# Patient Record
Sex: FEMALE | Race: WHITE | Employment: PART TIME | URBAN - METROPOLITAN AREA
[De-identification: names, ages, dates, MRNs, and addresses within clinical notes are randomized per-mention and may not be internally consistent; named-entity substitution may affect disease eponyms.]

---

## 2021-09-07 ENCOUNTER — HOSPITAL ENCOUNTER (EMERGENCY)
Age: 51
Discharge: HOME | End: 2021-09-07
Attending: EMERGENCY MEDICINE
Payer: MEDICAID

## 2021-09-07 VITALS
RESPIRATION RATE: 14 BRPM | TEMPERATURE: 97.9 F | DIASTOLIC BLOOD PRESSURE: 81 MMHG | HEART RATE: 81 BPM | OXYGEN SATURATION: 97 % | SYSTOLIC BLOOD PRESSURE: 137 MMHG

## 2021-09-07 DIAGNOSIS — N30.00 ACUTE CYSTITIS WITHOUT HEMATURIA: Primary | ICD-10-CM

## 2021-09-07 LAB
AMORPHOUS: ABNORMAL /HPF
BACTERIA: ABNORMAL /HPF
BILIRUBIN URINE: NEGATIVE
BLOOD, URINE: ABNORMAL
CLARITY: ABNORMAL
COLOR: ABNORMAL
EPITHELIAL CELLS, UA: ABNORMAL /HPF (ref 0–5)
GLUCOSE URINE: NEGATIVE MG/DL
HCG(URINE) PREGNANCY TEST: NEGATIVE
KETONES, URINE: ABNORMAL MG/DL
LEUKOCYTE ESTERASE, URINE: ABNORMAL
MICROSCOPIC EXAMINATION: YES
NITRITE, URINE: POSITIVE
PH UA: 6.5 (ref 5–8)
PROTEIN UA: NEGATIVE MG/DL
RBC UA: ABNORMAL /HPF (ref 0–4)
SPECIFIC GRAVITY UA: 1.02 (ref 1–1.03)
URINE REFLEX TO CULTURE: YES
URINE TYPE: ABNORMAL
UROBILINOGEN, URINE: 4 E.U./DL
WBC UA: ABNORMAL /HPF (ref 0–5)

## 2021-09-07 PROCEDURE — 81001 URINALYSIS AUTO W/SCOPE: CPT

## 2021-09-07 PROCEDURE — 99283 EMERGENCY DEPT VISIT LOW MDM: CPT

## 2021-09-07 PROCEDURE — 87086 URINE CULTURE/COLONY COUNT: CPT

## 2021-09-07 PROCEDURE — 84703 CHORIONIC GONADOTROPIN ASSAY: CPT

## 2021-09-07 RX ORDER — SULFAMETHOXAZOLE AND TRIMETHOPRIM 800; 160 MG/1; MG/1
1 TABLET ORAL 2 TIMES DAILY
Qty: 14 TABLET | Refills: 0 | Status: SHIPPED | OUTPATIENT
Start: 2021-09-07 | End: 2021-09-14

## 2021-09-07 RX ORDER — PHENAZOPYRIDINE HYDROCHLORIDE 100 MG/1
100 TABLET, FILM COATED ORAL 3 TIMES DAILY PRN
Qty: 6 TABLET | Refills: 0 | Status: SHIPPED | OUTPATIENT
Start: 2021-09-07 | End: 2021-09-10

## 2021-09-07 NOTE — ED PROVIDER NOTES
CHIEF COMPLAINT  Dysuria (painful urination x 1 week, denies n/v/d denies fever,, denies d/c )      HISTORY OF PRESENT ILLNESS  Vincent Mcgee is a 49 y.o. female who presents to the ED complaining of a 7-day history of urgency and dysuria. No fever. No chills. No hematuria. No nausea vomiting. No vaginal discharge. No back pain. No headache, sore throat, chest pain, shortness of breath, belly pain, rash. No other complaints, modifying factors or associated symptoms. Nursing notes reviewed. Past Medical History:   Diagnosis Date    Ovarian cyst     UTI (lower urinary tract infection)      History reviewed. No pertinent surgical history. History reviewed. No pertinent family history. Social History     Socioeconomic History    Marital status:      Spouse name: Not on file    Number of children: Not on file    Years of education: Not on file    Highest education level: Not on file   Occupational History    Not on file   Tobacco Use    Smoking status: Current Every Day Smoker     Packs/day: 0.50     Types: Cigarettes    Smokeless tobacco: Never Used   Substance and Sexual Activity    Alcohol use: No    Drug use: No    Sexual activity: Not on file   Other Topics Concern    Not on file   Social History Narrative    Not on file     Social Determinants of Health     Financial Resource Strain:     Difficulty of Paying Living Expenses:    Food Insecurity:     Worried About Running Out of Food in the Last Year:     [address] in the Last Year:    Transportation Needs:     Lack of Transportation (Medical):      Lack of Transportation (Non-Medical):    Physical Activity:     Days of Exercise per Week:     Minutes of Exercise per Session:    Stress:     Feeling of Stress :    Social Connections:     Frequency of Communication with Friends and Family:     Frequency of Social Gatherings with Friends and Family:     Attends Mandaeism Services:     Active Member of Clubs or Organizations:     Attends Club or Organization Meetings:     Marital Status:    Intimate Partner Violence:     Fear of Current or Ex-Partner:     Emotionally Abused:     Physically Abused:     Sexually Abused:      No current facility-administered medications for this encounter. Current Outpatient Medications   Medication Sig Dispense Refill    sulfamethoxazole-trimethoprim (BACTRIM DS) 800-160 MG per tablet Take 1 tablet by mouth 2 times daily for 7 days 14 tablet 0    phenazopyridine (PYRIDIUM) 100 MG tablet Take 1 tablet by mouth 3 times daily as needed for Pain 6 tablet 0    predniSONE (DELTASONE) 20 MG tablet 3 tabs po for 3 days  2 tabs po for 3 days  1 tab po for 3 days 18 tablet 0    ondansetron (ZOFRAN) 4 MG tablet Take 1 tablet by mouth every 8 hours as needed for Nausea 10 tablet 0    albuterol sulfate  (90 Base) MCG/ACT inhaler Inhale 2 puffs into the lungs every 6 hours as needed for Wheezing 1 Inhaler 0     Allergies   Allergen Reactions    Meclizine     Promethazine Hives    Ultram [Tramadol] Rash       REVIEW OF SYSTEMS  6 systems reviewed, pertinent positives per HPI otherwise noted to be negative    PHYSICAL EXAM  /81   Pulse 81   Temp 97.9 °F (36.6 °C) (Oral)   Resp 14   SpO2 97%   GENERAL APPEARANCE: Awake and alert. Cooperative. No acute distress. HEAD: Normocephalic. Atraumatic. EYES: no icterus   CHEST: Equal symmetric chest rise. LUNGS: Breathing is unlabored. Speaking comfortably in full sentences. ABD: soft, NT, ND  SKIN: Warm and dry. NEUROLOGICAL: Alert and oriented. Normal speech. Normal thought. Normal gait. RADIOLOGY  X-RAYS:  I have reviewed radiologic plain film image(s). ALL OTHER NON-PLAIN FILM IMAGES SUCH AS CT, ULTRASOUND AND MRI HAVE BEEN READ BY THE RADIOLOGIST. No orders to display              PROCEDURES    ED COURSE/MDM  Patient seen and evaluated. Patient has no CVA tenderness. No fever. No chills.   No nausea or vomiting. Her urinalysis is positive for urinary tract infection. She will be treated appropriately. Return precautions were given. Reasons to RT ED discussed. Patient expresses understanding and is in agreement with plan. Patient was given scripts for the following medications. I counseled patient how to take these medications. Discharge Medication List as of 9/7/2021 12:34 PM      START taking these medications    Details   sulfamethoxazole-trimethoprim (BACTRIM DS) 800-160 MG per tablet Take 1 tablet by mouth 2 times daily for 7 days, Disp-14 tablet, R-0Print      phenazopyridine (PYRIDIUM) 100 MG tablet Take 1 tablet by mouth 3 times daily as needed for Pain, Disp-6 tablet, R-0Print                 CLINICAL IMPRESSION  1. Acute cystitis without hematuria        Blood pressure 137/81, pulse 81, temperature 97.9 °F (36.6 °C), temperature source Oral, resp. rate 14, SpO2 97 %, not currently breastfeeding. DISPOSITION  Patient was discharged to home in good condition.        Cici Garcia MD  09/07/21 7741

## 2021-09-07 NOTE — LETTER
[address]   [address] [facility] [address]  Phone: [phone]             September 7, 2021    Patient: Brice Basurto   YOB: 1970   Date of Visit: 9/7/2021       To Whom It May Concern:    Tavia Sanchez was seen and treated in our emergency department on 9/7/2021. She may return to work on 9/8/2021.       Sincerely,             Signature:__________________________________

## 2021-09-07 NOTE — ED NOTES
Pt d/c home with AVS no s.s of distress noted script x 2 in hand pt mariano.romina home      Alma Andujar RN  09/07/21 8904

## 2021-09-08 LAB — URINE CULTURE, ROUTINE: NORMAL

## 2021-12-30 ENCOUNTER — HOSPITAL ENCOUNTER (EMERGENCY)
Age: 51
Discharge: HOME | End: 2021-12-30
Attending: EMERGENCY MEDICINE
Payer: MEDICAID

## 2021-12-30 ENCOUNTER — APPOINTMENT (OUTPATIENT)
Dept: GENERAL RADIOLOGY | Age: 51
End: 2021-12-30
Payer: MEDICAID

## 2021-12-30 VITALS
WEIGHT: 121 LBS | OXYGEN SATURATION: 99 % | BODY MASS INDEX: 19.53 KG/M2 | DIASTOLIC BLOOD PRESSURE: 78 MMHG | TEMPERATURE: 98.6 F | HEART RATE: 85 BPM | SYSTOLIC BLOOD PRESSURE: 114 MMHG | RESPIRATION RATE: 16 BRPM

## 2021-12-30 DIAGNOSIS — R05.9 COUGH: Primary | ICD-10-CM

## 2021-12-30 LAB — SARS-COV-2: DETECTED

## 2021-12-30 PROCEDURE — U0005 INFEC AGEN DETEC AMPLI PROBE: HCPCS

## 2021-12-30 PROCEDURE — 99283 EMERGENCY DEPT VISIT LOW MDM: CPT

## 2021-12-30 PROCEDURE — 71045 X-RAY EXAM CHEST 1 VIEW: CPT

## 2021-12-30 PROCEDURE — U0003 INFECTIOUS AGENT DETECTION BY NUCLEIC ACID (DNA OR RNA); SEVERE ACUTE RESPIRATORY SYNDROME CORONAVIRUS 2 (SARS-COV-2) (CORONAVIRUS DISEASE [COVID-19]), AMPLIFIED PROBE TECHNIQUE, MAKING USE OF HIGH THROUGHPUT TECHNOLOGIES AS DESCRIBED BY CMS-2020-01-R: HCPCS

## 2021-12-30 RX ORDER — DOXYCYCLINE HYCLATE 100 MG
100 TABLET ORAL 2 TIMES DAILY
Qty: 20 TABLET | Refills: 0 | Status: SHIPPED | OUTPATIENT
Start: 2021-12-30 | End: 2022-01-09

## 2021-12-30 NOTE — LETTER
January 2, 2022    [address] [facility]      Dear Ms. Kevin Hernandez,    During your Emergency Department visit on 12/30/2021, radiology and/or lab tests were taken and sent for analysis. The Emergency Department physician has reviewed the results and would like to discuss the findings with you. As of this time, attempts to reach you have been unsuccessful. Please contact the Emergency Department at [phone] and ask to speak to the Charge Nurse regarding your results and the possible need for further treatment. The patient was called for notification of a POSITIVE test result for COVID-19. The number is incorrect:     The COVID-19 test result was positive   Treatment of coronavirus does not require an antibiotic   Remain isolated for 10 days since symptoms first appeared AND At least 3 days have passed after recovery (Recovery is defined as resolution of fever without the use of fever-reducing medications with progressive improvement or resolution of other symptoms).  Wash hands often with soap and water for at least 20 seconds or alternatively use hand  with at least 60% alcohol content   Cover coughs and sneezes   Wear a mask when around others if possible   Clean all high-touch surfaces every day, such as doorknobs and cellphones   Continually monitor symptoms. Contact a medical provider if symptoms are worsening, such as difficulty breathing.    For additional information, please visit the Centers for Disease Control and Prevention ([email or web address].cy.       Sincerely,     Dr. Doc Green [phone]

## 2021-12-30 NOTE — LETTER
[place] [address]  Phone: [phone]               December 30, 2021    Patient: Tony Gonzalez   YOB: 1970   Date of Visit: 12/30/2021       To Whom It May Concern:    Romero Cancer was seen and treated in our emergency department on 12/30/2021. She may return to work on 1/3/2022.       Sincerely,       Saturnino Lynch RN        Signature:__________________________________

## 2021-12-30 NOTE — ED PROVIDER NOTES
[address] ENCOUNTER      Pt Name: Dannie Cortez  MRN: [id number]  [place] 1970  Date of evaluation: 12/30/2021  Provider: Amy Tariq MD    [address]       Chief Complaint   Patient presents with    Fatigue     c/o weakness and decreased appetite since 12/25. No nausea, vomiting or diarrhea. Family member recently tested positive for Covid       HISTORY OF PRESENT ILLNESS    Dannie Cortez is a 49 y.o. female who presents to the emergency department with cough and fatigue. Endorses cough and fatigue last few days. Positive for no energy. Concern for Covid. Endorses smoking and possible wheezing. No shortness of breath or chest pain. Symptoms started spontaneously. Been constant in nature. Positive for 2 out of 10 achy body aches. No other associated symptoms. Nursing Notes were reviewed. Including nursing noted for FM, Surgical History, Past Medical History, Social History, vitals, and allergies; agree with all. REVIEW OF SYSTEMS       Review of Systems    Except as noted above the remainder of the review of systems was reviewed and negative. PAST MEDICAL HISTORY     Past Medical History:   Diagnosis Date    Ovarian cyst     UTI (lower urinary tract infection)        SURGICAL HISTORY     History reviewed. No pertinent surgical history. CURRENT MEDICATIONS       Discharge Medication List as of 12/30/2021 11:24 AM      CONTINUE these medications which have NOT CHANGED    Details   albuterol sulfate  (90 Base) MCG/ACT inhaler Inhale 2 puffs into the lungs every 6 hours as needed for Wheezing, Disp-1 Inhaler, R-0Print             ALLERGIES     Meclizine, Promethazine, and Ultram [tramadol]    FAMILY HISTORY      History reviewed. No pertinent family history.     SOCIAL HISTORY       Social History     Socioeconomic History    Marital status:      Spouse name: None    Number of children: None    Years of education: None    Highest education level: None   Occupational History    None   Tobacco Use    Smoking status: Current Every Day Smoker     Packs/day: 0.50     Types: Cigarettes    Smokeless tobacco: Never Used   Substance and Sexual Activity    Alcohol use: No    Drug use: No    Sexual activity: None   Other Topics Concern    None   Social History Narrative    None     Social Determinants of Health     Financial Resource Strain:     Difficulty of Paying Living Expenses: Not on file   Food Insecurity:     Worried About Running Out of Food in the Last Year: Not on file    Lolly of Food in the Last Year: Not on file   Transportation Needs:     Lack of Transportation (Medical): Not on file    Lack of Transportation (Non-Medical): Not on file   Physical Activity:     Days of Exercise per Week: Not on file    Minutes of Exercise per Session: Not on file   Stress:     Feeling of Stress : Not on file   Social Connections:     Frequency of Communication with Friends and Family: Not on file    Frequency of Social Gatherings with Friends and Family: Not on file    Attends Christianity Services: Not on file    Active Member of [address] [place] [address] or Organizations: Not on file    Attends Club or Organization Meetings: Not on file    Marital Status: Not on file   Intimate Partner Violence:     Fear of Current or Ex-Partner: Not on file    Emotionally Abused: Not on file    Physically Abused: Not on file    Sexually Abused: Not on file   Housing Stability:     Unable to Pay for Housing in the Last Year: Not on file    Number of [place] in the Last Year: Not on file    Unstable Housing in the Last Year: Not on file       PHYSICAL EXAM       ED Triage Vitals [12/30/21 1036]   BP Temp Temp Source Pulse Resp SpO2 Height Weight   114/78 98.6 °F (37 °C) Oral 85 16 99 % -- 121 lb (54.9 kg)       Physical Exam  Vitals and nursing note reviewed. Constitutional:       General: She is not in acute distress. none    LABS:  Labs Reviewed   COVID-19   COVID-19       All other labs were withinnormal range or not returned as of this dictation. EMERGENCY DEPARTMENT COURSE and DIFFERENTIAL DIAGNOSIS/MDM:     PMH, Surgical Hx, FH, Social Hx reviewed by myself (ETOH usage, Tobacco usage, Drug usage reviewed by myself, no pertinent Hx)- No Pertinent Hx     Old records were reviewed by me     52-year-old with cough, concern for URI versus bronchitis versus developing pneumonia. Antibiotics initiated. When I percent on room air. Outpatient follow-up. I estimate there is LOW risk for Sepsis, MI, Stroke, Tamponade, PTX, Toxicity or other life threatening etiology thus I consider the discharge disposition reasonable. The patient is at low risk for mortality based on demographic, history and clinical factors. Given the best available information and clinical assessment, I estimate the risk of hospitalization to be greater than risk of treatment at home. I have explained to the patient that the risk could rapidly change, given precautions for return and instructions. Explained to patient that the risk for mortality is low based on demographic, history and clinical factors. I discussed with patient the results of evaluation in the ED, diagnosis, care, and prognosis. The plan is to discharge to home. Patient is in agreement with plan and questions have been answered. I also discussed with patient the reasons which may require a return visit and the importance of follow-up care. The patient is well-appearing, nontoxic, and improved at the time of discharge. Patient agrees to call to arrange follow-up care as directed. Patient understands to return immediately for worsening/change in symptoms. CRITICAL CARE TIME   Total Critical Caretime was  minutes, excluding separately reportable procedures.   There was a high probability of clinically significant/life threatening deterioration in the patient's condition which required my urgent intervention. PROCEDURES:  Unlessotherwise noted below, none    FINAL IMPRESSION      1.  Cough          DISPOSITION/PLAN   DISPOSITION Decision To Discharge 12/30/2021 11:16:41 AM    PATIENT REFERRED TO:  PCP    Call today        DISCHARGE MEDICATIONS:  Discharge Medication List as of 12/30/2021 11:24 AM      START taking these medications    Details   doxycycline hyclate (VIBRA-TABS) 100 MG tablet Take 1 tablet by mouth 2 times daily for 10 days, Disp-20 tablet, R-0Print                (Please note that portions ofthis note were completed with a voice recognition program.  Efforts were made to edit the dictations but occasionally words are mis-transcribed.)    Vicky Hassan MD(electronically signed)  Attending Emergency Physician         Vicky Hassan MD  12/30/21 [phone]

## 2021-12-31 NOTE — RESULT ENCOUNTER NOTE
Patient's positive result has been appropriately evaluated by the provider pool. Patient was unable to be reached over the phone. Pt number listed was not in service. A voicemail was left with the patient on emergency contact phone #. Will await a return phone call. Will try to reach patient again tomorrow per protocol.

## 2022-07-31 ENCOUNTER — HOSPITAL ENCOUNTER (EMERGENCY)
Age: 52
Discharge: HOME | End: 2022-07-31
Attending: EMERGENCY MEDICINE
Payer: MEDICAID

## 2022-07-31 VITALS
RESPIRATION RATE: 18 BRPM | BODY MASS INDEX: 22.39 KG/M2 | SYSTOLIC BLOOD PRESSURE: 122 MMHG | OXYGEN SATURATION: 100 % | WEIGHT: 121.69 LBS | TEMPERATURE: 97.7 F | DIASTOLIC BLOOD PRESSURE: 71 MMHG | HEIGHT: 62 IN | HEART RATE: 77 BPM

## 2022-07-31 DIAGNOSIS — N10 ACUTE PYELONEPHRITIS: Primary | ICD-10-CM

## 2022-07-31 LAB
BACTERIA: ABNORMAL /HPF
BILIRUBIN URINE: NEGATIVE
BLOOD, URINE: ABNORMAL
CLARITY: ABNORMAL
COLOR: ABNORMAL
EPITHELIAL CELLS, UA: ABNORMAL /HPF (ref 0–5)
GLUCOSE URINE: NEGATIVE MG/DL
KETONES, URINE: NEGATIVE MG/DL
LEUKOCYTE ESTERASE, URINE: ABNORMAL
MICROSCOPIC EXAMINATION: YES
NITRITE, URINE: POSITIVE
PH UA: 7 (ref 5–8)
PROTEIN UA: ABNORMAL MG/DL
RBC UA: ABNORMAL /HPF (ref 0–4)
SPECIFIC GRAVITY UA: 1.02 (ref 1–1.03)
URINE REFLEX TO CULTURE: YES
URINE TYPE: ABNORMAL
UROBILINOGEN, URINE: 1 E.U./DL
WBC UA: ABNORMAL /HPF (ref 0–5)

## 2022-07-31 PROCEDURE — 99283 EMERGENCY DEPT VISIT LOW MDM: CPT

## 2022-07-31 PROCEDURE — 87086 URINE CULTURE/COLONY COUNT: CPT

## 2022-07-31 PROCEDURE — 81001 URINALYSIS AUTO W/SCOPE: CPT

## 2022-07-31 PROCEDURE — 87077 CULTURE AEROBIC IDENTIFY: CPT

## 2022-07-31 RX ORDER — KETOROLAC TROMETHAMINE 10 MG/1
10 TABLET, FILM COATED ORAL EVERY 6 HOURS PRN
Qty: 20 TABLET | Refills: 0 | Status: SHIPPED | OUTPATIENT
Start: 2022-07-31 | End: 2022-10-08

## 2022-07-31 RX ORDER — PHENAZOPYRIDINE HYDROCHLORIDE 200 MG/1
200 TABLET, FILM COATED ORAL 3 TIMES DAILY PRN
Qty: 6 TABLET | Refills: 0 | Status: SHIPPED | OUTPATIENT
Start: 2022-07-31 | End: 2022-08-03

## 2022-07-31 RX ORDER — SULFAMETHOXAZOLE AND TRIMETHOPRIM 800; 160 MG/1; MG/1
1 TABLET ORAL 2 TIMES DAILY
Qty: 20 TABLET | Refills: 0 | Status: SHIPPED | OUTPATIENT
Start: 2022-07-31 | End: 2022-08-10

## 2022-07-31 ASSESSMENT — ENCOUNTER SYMPTOMS
EYE DISCHARGE: 0
DIARRHEA: 0
NAUSEA: 0
SHORTNESS OF BREATH: 0
BACK PAIN: 0
EYE PAIN: 0
SORE THROAT: 0
ABDOMINAL PAIN: 0
WHEEZING: 0
RHINORRHEA: 0
VOMITING: 0
COUGH: 0

## 2022-07-31 ASSESSMENT — PAIN DESCRIPTION - LOCATION
LOCATION: FLANK
LOCATION: FLANK

## 2022-07-31 ASSESSMENT — PAIN SCALES - GENERAL
PAINLEVEL_OUTOF10: 5
PAINLEVEL_OUTOF10: 5

## 2022-07-31 ASSESSMENT — PAIN DESCRIPTION - PAIN TYPE: TYPE: ACUTE PAIN

## 2022-07-31 ASSESSMENT — PAIN DESCRIPTION - ORIENTATION: ORIENTATION: RIGHT;LEFT

## 2022-07-31 ASSESSMENT — PAIN DESCRIPTION - DESCRIPTORS: DESCRIPTORS: SHARP

## 2022-07-31 NOTE — ED PROVIDER NOTES
[address] [facility]  eMERGENCY dEPARTMENT eNCOUnter        Pt Name: Junior Tejeda  MRN: [id number]  [place] 1970  Date of evaluation: 7/31/2022  Provider: Tamela Sales MD  PCP: No primary care provider on file. CHIEF COMPLAINT       Chief Complaint   Patient presents with    Flank Pain     Bilat flank pain started 7/29. Bilat flank pain now at 5. Burning with urination, urgency and dribbling reported by pt. No N/V/D. Took tylenol at 0430    Dysuria       HISTORY OFPRESENT ILLNESS   (Location/Symptom, Timing/Onset, Context/Setting, Quality, Duration, Modifying Factors,Severity)  Note limiting factors. Junior Tejeda is a 47 y.o. female       Location/Symptom: Patient is concerned about having a kidney infection  Timing/Onset: 2 days ago gradually getting worse  Context/Setting: She does have a history of previous urinary tract infections and kidney infections. Quality: Both sharp and dull  Duration: 2 days  Modifying Factors: No associated fever chills nausea or vomiting. It does hurt to urinate. Nursing Noteswere all reviewed and agreed with or any disagreements were addressed  in the HPI. REVIEW OF SYSTEMS    (2-9 systems for level 4, 10 or more for level 5)     Review of Systems   Constitutional:  Negative for chills, fatigue and fever. HENT:  Negative for ear pain, rhinorrhea and sore throat. Eyes:  Negative for pain, discharge and visual disturbance. Respiratory:  Negative for cough, shortness of breath and wheezing. Cardiovascular:  Negative for chest pain, palpitations and leg swelling. Gastrointestinal:  Negative for abdominal pain, diarrhea, nausea and vomiting. Genitourinary:  Positive for dysuria and flank pain. Negative for difficulty urinating, pelvic pain and vaginal discharge. Musculoskeletal:  Negative for arthralgias, back pain, joint swelling and neck pain. Skin:  Negative for rash.    Allergic/Immunologic: Negative for environmental allergies. Neurological:  Negative for dizziness, seizures, syncope and headaches. Hematological:  Negative for adenopathy. Psychiatric/Behavioral:  Negative for dysphoric mood and suicidal ideas. The patient is not nervous/anxious. PAST MEDICAL HISTORY     Past Medical History:   Diagnosis Date    Ovarian cyst     UTI (lower urinary tract infection)          SURGICAL HISTORY   History reviewed. No pertinent surgical history. CURRENTMEDICATIONS       Previous Medications    No medications on file       ALLERGIES     Meclizine, Promethazine, and Ultram [tramadol]    FAMILY HISTORY     History reviewed. No pertinent family history. SOCIAL HISTORY       Social History     Socioeconomic History    Marital status:      Spouse name: None    Number of children: None    Years of education: None    Highest education level: None   Tobacco Use    Smoking status: Every Day     Packs/day: 0.50     Types: Cigarettes    Smokeless tobacco: Never   Substance and Sexual Activity    Alcohol use: No    Drug use: No       SCREENINGS    Kaylen Coma Scale  Eye Opening: Spontaneous  Best Verbal Response: Oriented  Best Motor Response: Obeys commands  [place] Coma Scale Score: 15        PHYSICAL EXAM    (up to 7 for level 4, 8 or more for level 5)     ED Triage Vitals [07/31/22 1015]   BP Temp Temp Source Heart Rate Resp SpO2 Height Weight   122/71 97.7 °F (36.5 °C) Oral 77 18 100 % 5' 2\" (1.575 m) 121 lb 11.1 oz (55.2 kg)      height is 5' 2\" (1.575 m) and weight is 121 lb 11.1 oz (55.2 kg). Her oral temperature is 97.7 °F (36.5 °C). Her blood pressure is 122/71 and her pulse is 77. Her respiration is 18 and oxygen saturation is 100%. Physical Exam  Constitutional:       Appearance: She is well-developed. She is not diaphoretic. HENT:      Head: Normocephalic and atraumatic. Right Ear: External ear normal.      Left Ear: External ear normal.   Eyes:      General: No scleral icterus. Right eye: No discharge. Left eye: No discharge. Neck:      Thyroid: No thyromegaly. Vascular: No JVD. Trachea: No tracheal deviation. Cardiovascular:      Rate and Rhythm: Normal rate and regular rhythm. Heart sounds: No murmur heard. No friction rub. No gallop. Pulmonary:      Effort: Pulmonary effort is normal. No respiratory distress. Breath sounds: Normal breath sounds. No stridor. No wheezing or rales. Abdominal:      General: There is no distension. Palpations: Abdomen is soft. Tenderness: no abdominal tenderness There is right CVA tenderness and left CVA tenderness. There is no guarding or rebound. Musculoskeletal:         General: No tenderness. Cervical back: Normal range of motion. Skin:     General: Skin is warm and dry. Findings: No rash (On exposed body surfaces). Neurological:      Mental Status: She is alert and oriented to person, place, and time. Coordination: Coordination normal.   Psychiatric:         Behavior: Behavior normal.         Thought Content:  Thought content normal.       DIAGNOSTIC RESULTS   LABS:    Results for orders placed or performed during the hospital encounter of 07/31/22   Urinalysis with Reflex to Culture    Specimen: Urine   Result Value Ref Range    Color, UA DARK YELLOW (A) Straw/Yellow    Clarity, UA CLOUDY (A) Clear    Glucose, Ur Negative Negative mg/dL    Bilirubin Urine Negative Negative    Ketones, Urine Negative Negative mg/dL    Specific Gravity, UA 1.020 1.005 - 1.030    Blood, Urine TRACE-INTACT (A) Negative    pH, UA 7.0 5.0 - 8.0    Protein, UA TRACE (A) Negative mg/dL    Urobilinogen, Urine 1.0 <2.0 E.U./dL    Nitrite, Urine POSITIVE (A) Negative    Leukocyte Esterase, Urine SMALL (A) Negative    Microscopic Examination YES     Urine Type Voided     Urine Reflex to Culture Yes    Microscopic Urinalysis   Result Value Ref Range    WBC, UA  (A) 0 - 5 /HPF    RBC, UA None seen 0 - 4 /HPF    Epithelial Cells, UA 6-10 (A) 0 - 5 /HPF    Bacteria, UA 4+ (A) None Seen /HPF       All other labs were within normal range or not returned as of this dictation. EKG: All EKG's are interpreted by the Emergency Department Physician who either signs orCo-signs this chart in the absence of a cardiologist.    None    RADIOLOGY:   plain film images such as CT, Ultrasound and MRI are read by the radiologist. Plain radiographic images are visualized and preliminarily interpreted by the  EDProvider with the below findings:    None        PROCEDURES   Unless otherwise noted below, none     Procedures    CRITICAL CARE TIME   N/A    CONSULTS:  None    EMERGENCY DEPARTMENT COURSE and DIFFERENTIAL DIAGNOSIS/MDM:   Vitals:    Vitals:    07/31/22 1015   BP: 122/71   Pulse: 77   Resp: 18   Temp: 97.7 °F (36.5 °C)   TempSrc: Oral   SpO2: 100%   Weight: 121 lb 11.1 oz (55.2 kg)   Height: 5' 2\" (1.575 m)       Patient was given the following medications:  Medications - No data to display    Stable. At this time there is no signs of sepsis. Heart rate temperature and blood pressure are all within normal limits. Patient is placed on Bactrim Pyridium and Toradol. Is this patient to be included in the SEP-1 Core Measure due to severe sepsis or septic shock? No   Exclusion criteria - the patient is NOT to be included for SEP-1 Core Measure due to:  2+ SIRS criteria are not met    FINAL IMPRESSION      1.  Acute pyelonephritis          DISPOSITION/PLAN   DISPOSITION Decision To Discharge 07/31/2022 10:53:39 AM      PATIENT REFERRED TO:  Aleyda M[address]Z [address]  [place] [address]  [phone]    If symptoms worsen    [facility]) Pre-Services  [phone]        DISCHARGE MEDICATIONS:  New Prescriptions    KETOROLAC (TORADOL) 10 MG TABLET    Take 1 tablet by mouth every 6 hours as needed for Pain Do not take more than 4 pills latricia 24 hour period    PHENAZOPYRIDINE (PYRIDIUM) 200

## 2022-07-31 NOTE — ED NOTES
Bilat flank pain started 7/29. Bilat flank pain now at 5. Burning with urination, urgency and dribbling reported by pt. No N/V/D.   Took tylenol at [id number] [facility]  07/31/22 1036

## 2022-07-31 NOTE — Clinical Note
Abby Ordaz was seen and treated in our emergency department on 7/31/2022. She may return to work on 08/03/2022. If you have any questions or concerns, please don't hesitate to call.       Elvira Delgado MD

## 2022-08-01 LAB
ORGANISM: ABNORMAL
URINE CULTURE, ROUTINE: ABNORMAL

## 2022-08-02 NOTE — RESULT ENCOUNTER NOTE
Patient's positive result has been appropriately evaluated by the provider pool. Patient was contacted and notified of the change in treatment plan.  Call left on pts phone to call back antibiotic changed    Medication was phoned to the patient's pharmacy per protocol:    Pharmacy:   [id number] - [id number], [address]  [address] [place] [address]  Phone: [phone] Fax: [phone]    Phone number:   Pharmacist receiving the prescription:

## 2022-08-02 NOTE — ED NOTES
Discharge instructions with pt. Explained rx's. Pyelonephritis teaching done. Pain in bilat flanks at 5. Home ambulatory. Explained importance of having a family doctor for regular medical care. Explained how to get a family doctor. [place] clinic info sheet given.  [facility] clinic list given. [facility] referral line given. DIRK Ferrari , phone number listed in case pt needs any further assistance.      Hussain Larry RN  08/01/22 1346

## 2022-10-01 ENCOUNTER — HOSPITAL ENCOUNTER (EMERGENCY)
Age: 52
Discharge: HOME | End: 2022-10-01
Payer: MEDICAID

## 2022-10-01 VITALS
TEMPERATURE: 97.8 F | HEIGHT: 64 IN | RESPIRATION RATE: 16 BRPM | OXYGEN SATURATION: 97 % | BODY MASS INDEX: 20.29 KG/M2 | WEIGHT: 118.83 LBS | HEART RATE: 99 BPM | DIASTOLIC BLOOD PRESSURE: 88 MMHG | SYSTOLIC BLOOD PRESSURE: 138 MMHG

## 2022-10-01 DIAGNOSIS — N10 ACUTE PYELONEPHRITIS: Primary | ICD-10-CM

## 2022-10-01 LAB
BACTERIA: ABNORMAL /HPF
BILIRUBIN URINE: NEGATIVE
BLOOD, URINE: ABNORMAL
CLARITY: ABNORMAL
COLOR: YELLOW
EPITHELIAL CELLS, UA: ABNORMAL /HPF (ref 0–5)
GLUCOSE URINE: NEGATIVE MG/DL
HCG(URINE) PREGNANCY TEST: NEGATIVE
KETONES, URINE: NEGATIVE MG/DL
LEUKOCYTE ESTERASE, URINE: ABNORMAL
MICROSCOPIC EXAMINATION: YES
MUCUS: ABNORMAL /LPF
NITRITE, URINE: NEGATIVE
PH UA: 6 (ref 5–8)
PROTEIN UA: NEGATIVE MG/DL
RBC UA: ABNORMAL /HPF (ref 0–4)
SPECIFIC GRAVITY UA: 1.02 (ref 1–1.03)
TRICHOMONAS: ABNORMAL /HPF
URINE REFLEX TO CULTURE: YES
URINE TYPE: ABNORMAL
UROBILINOGEN, URINE: 1 E.U./DL
WBC UA: ABNORMAL /HPF (ref 0–5)

## 2022-10-01 PROCEDURE — 84703 CHORIONIC GONADOTROPIN ASSAY: CPT

## 2022-10-01 PROCEDURE — 81001 URINALYSIS AUTO W/SCOPE: CPT

## 2022-10-01 PROCEDURE — 87077 CULTURE AEROBIC IDENTIFY: CPT

## 2022-10-01 PROCEDURE — 99283 EMERGENCY DEPT VISIT LOW MDM: CPT

## 2022-10-01 PROCEDURE — 87086 URINE CULTURE/COLONY COUNT: CPT

## 2022-10-01 RX ORDER — PHENAZOPYRIDINE HYDROCHLORIDE 100 MG/1
200 TABLET, FILM COATED ORAL 3 TIMES DAILY PRN
Qty: 6 TABLET | Refills: 0 | Status: SHIPPED | OUTPATIENT
Start: 2022-10-01 | End: 2022-10-03

## 2022-10-01 RX ORDER — CEFUROXIME AXETIL 250 MG/1
250 TABLET ORAL 2 TIMES DAILY
Qty: 14 TABLET | Refills: 0 | Status: SHIPPED | OUTPATIENT
Start: 2022-10-01 | End: 2022-10-08

## 2022-10-01 ASSESSMENT — PAIN - FUNCTIONAL ASSESSMENT: PAIN_FUNCTIONAL_ASSESSMENT: NONE - DENIES PAIN

## 2022-10-01 NOTE — ED NOTES
AVS reviewed with patient. Verbalized understanding. AVS was printed and given to patient. Prescriptions sent electronically to patients pharmacy of choice.      Ada Bermeo RN  10/01/22 1434

## 2022-10-01 NOTE — LETTER
[facility] [address]  Phone: [phone]               October 4, 2022    Patient: Sruthi Hewitt   YOB: 1970   Date of Visit: 10/1/2022       To Whom It May Concern:    Sylvester Canas was seen and treated in our emergency department on 10/1/2022. She may return to work on Oct 5th. Off work Oct 3rd and 4th.       Sincerely,       Fawad Rodrigez RN         Signature:__________________________________

## 2022-10-01 NOTE — Clinical Note
Jonn Hopkins was seen and treated in our emergency department on 10/1/2022. She may return to work on 10/03/2022. If you have any questions or concerns, please don't hesitate to call.       Moraima Solis

## 2022-10-01 NOTE — ED PROVIDER NOTES
[address] [facility]  [address] [facility] [address]  Dept: [phone]  Loc: [address] ENCOUNTER        This patient was not seen or evaluated by the attending physician. I evaluated this patient, the attending physician was available for consultation. CHIEF COMPLAINT    Chief Complaint   Patient presents with    Urinary Frequency     C/o painful frequent urination and urinating in small amounts for 1 week       HPI    Veronica Vaughn is a 47 y.o. female who presents with complaint of UTI symptoms. The onset of the symptoms was about a week ago. The duration has been constant since the onset. The patient has associated suprapubic abdominal pain that is not severe, but mildly uncomfortable, with some mild back pain as well. She states she has urinary tract infections in the past, and feels like that is what her symptoms are related to today. She denies any fevers, chills, nausea, vomiting, diarrhea or constipation. She denies any vaginal bleeding or discharge. She has no further complaints. REVIEW OF SYSTEMS    : See HPI, no hematuria  GI: No vomiting or diarrhea  General: No measured fevers    PAST MEDICAL & SURGICAL HISTORY    Past Medical History:   Diagnosis Date    Ovarian cyst     UTI (lower urinary tract infection)      History reviewed. No pertinent surgical history.     CURRENT MEDICATIONS  (may include discharge medications prescribed in the ED)  Current Outpatient Rx   Medication Sig Dispense Refill    cefUROXime (CEFTIN) 250 MG tablet Take 1 tablet by mouth 2 times daily for 7 days 14 tablet 0    phenazopyridine (PYRIDIUM) 100 MG tablet Take 2 tablets by mouth 3 times daily as needed for Pain 6 tablet 0    ketorolac (TORADOL) 10 MG tablet Take 1 tablet by mouth every 6 hours as needed for Pain Do not take more than 4 pills latricia 24 hour period 20 tablet 0       ALLERGIES    Allergies   Allergen Reactions    Meclizine     Promethazine Hives    Ultram [Tramadol] Rash       SOCIAL HISTORY    Social History     Socioeconomic History    Marital status:      Spouse name: None    Number of children: None    Years of education: None    Highest education level: None   Tobacco Use    Smoking status: Every Day     Packs/day: 0.50     Types: Cigarettes    Smokeless tobacco: Never   Substance and Sexual Activity    Alcohol use: No    Drug use: No       PHYSICAL EXAM    VITAL SIGNS: /88   Pulse 99   Temp 97.8 °F (36.6 °C) (Oral)   Resp 16   Ht 5' 4\" (1.626 m)   Wt 118 lb 13.3 oz (53.9 kg)   LMP 08/10/2018 (Approximate)   SpO2 97%   BMI 20.40 kg/m²    Constitutional:  Well developed, well nourished  HENT:  Atraumatic,  Moist mucus membranes  EYES: Conjunctiva Moist, Nonicteric  NECK: No JVD, supple   Respiratory:  No respiratory distress  Cardiovascular: no JVD   Abdomen:  Soft, mild suprapubic abdominal tenderness to palpation, mild bilateral CVA flank tenderness   Integument:  Skin is warm and dry   Neurologic: Awake, alert, normal flow speech, no tremors    RADIOLOGY/PROCEDURES    No orders to display       ED COURSE & MEDICAL DECISION MAKING    Pertinent Labs studies interpreted and reviewed. (See chart for details)  See chart for details of medications given during the ED stay. Is this patient to be included in the SEP-1 Core Measure due to severe sepsis or septic shock? No   Exclusion criteria - the patient is NOT to be included for SEP-1 Core Measure due to:  2+ SIRS criteria are not met      Differential Diagnosis: Urinary retention, pyelonephritis, bladder infection, urosepsis, GI emergency, surgical emergency, dehydration, other    Patient is afebrile and nontoxic in appearance. Minimal suprapubic abdominal tenderness with mild CVA tenderness on exam.  Urinalysis reveals leukocytes, with 21-50 white blood cells present, 2+ bacteria. She is not pregnant.   I will treat her for pyelonephritis given her CVA tenderness. She will be discharged home with Ceftin, Pyridium. She is given a work excuse for tomorrow. She is given strict return precautions. I instructed the patient to follow up as an outpatient in 2 days. I instructed the patient to return to the ED immediately for any new or worsening symptoms. The patient verbalizes understanding. FINAL IMPRESSION    1.  Acute pyelonephritis        PLAN  Discharge with outpatient followup, instructions and medication (see EMR)     (Please note that this note was completed with a voice recognition program.  Every attempt was made to edit the dictations, but inevitably there remain words that are mis-transcribed.)                 Moraima Mills  10/01/22 1762

## 2022-10-03 LAB
ORGANISM: ABNORMAL
URINE CULTURE, ROUTINE: ABNORMAL

## 2022-10-04 NOTE — ED NOTES
Was at work yesterday and left  She states too hard to stand all day  She is off the next 2 days and wanted a work note for today  She states she is getting better but cant not work today  Denies fever increased pain  She is here with her daughter  She states she will see a new pmd this week  Aware she is anemic     Celio Lam, RN  10/04/22 8135

## 2022-10-07 PROCEDURE — 99283 EMERGENCY DEPT VISIT LOW MDM: CPT

## 2022-10-08 ENCOUNTER — HOSPITAL ENCOUNTER (EMERGENCY)
Age: 52
Discharge: HOME | End: 2022-10-08
Attending: EMERGENCY MEDICINE
Payer: MEDICAID

## 2022-10-08 VITALS
WEIGHT: 115.3 LBS | SYSTOLIC BLOOD PRESSURE: 136 MMHG | OXYGEN SATURATION: 99 % | TEMPERATURE: 97.6 F | HEIGHT: 62 IN | DIASTOLIC BLOOD PRESSURE: 80 MMHG | HEART RATE: 88 BPM | BODY MASS INDEX: 21.22 KG/M2 | RESPIRATION RATE: 14 BRPM

## 2022-10-08 DIAGNOSIS — N30.00 ACUTE CYSTITIS WITHOUT HEMATURIA: Primary | ICD-10-CM

## 2022-10-08 LAB
AMORPHOUS: ABNORMAL /HPF
BACTERIA: ABNORMAL /HPF
BILIRUBIN URINE: NEGATIVE
BLOOD, URINE: ABNORMAL
CLARITY: CLEAR
COLOR: YELLOW
EPITHELIAL CELLS, UA: ABNORMAL /HPF (ref 0–5)
GLUCOSE URINE: NEGATIVE MG/DL
KETONES, URINE: NEGATIVE MG/DL
LEUKOCYTE ESTERASE, URINE: NEGATIVE
MICROSCOPIC EXAMINATION: YES
MUCUS: ABNORMAL /LPF
NITRITE, URINE: NEGATIVE
PH UA: 6 (ref 5–8)
PROTEIN UA: NEGATIVE MG/DL
RBC UA: ABNORMAL /HPF (ref 0–4)
SPECIFIC GRAVITY UA: >=1.03 (ref 1–1.03)
URINE REFLEX TO CULTURE: YES
URINE TYPE: ABNORMAL
UROBILINOGEN, URINE: 0.2 E.U./DL
WBC UA: ABNORMAL /HPF (ref 0–5)

## 2022-10-08 PROCEDURE — 81001 URINALYSIS AUTO W/SCOPE: CPT

## 2022-10-08 PROCEDURE — 87086 URINE CULTURE/COLONY COUNT: CPT

## 2022-10-08 RX ORDER — SULFAMETHOXAZOLE AND TRIMETHOPRIM 800; 160 MG/1; MG/1
1 TABLET ORAL 2 TIMES DAILY
Qty: 14 TABLET | Refills: 0 | Status: SHIPPED | OUTPATIENT
Start: 2022-10-08 | End: 2022-10-15

## 2022-10-08 ASSESSMENT — ENCOUNTER SYMPTOMS
ABDOMINAL PAIN: 1
RESPIRATORY NEGATIVE: 1
SHORTNESS OF BREATH: 0
SORE THROAT: 0

## 2022-10-08 ASSESSMENT — PAIN - FUNCTIONAL ASSESSMENT
PAIN_FUNCTIONAL_ASSESSMENT: NONE - DENIES PAIN
PAIN_FUNCTIONAL_ASSESSMENT: NONE - DENIES PAIN

## 2022-10-08 NOTE — Clinical Note
James Robbins was seen and treated in our emergency department on 10/7/2022. She may return to work on 10/09/2022. If you have any questions or concerns, please don't hesitate to call.       Brittany Jean-Baptiste MD

## 2022-10-08 NOTE — ED PROVIDER NOTES
[address] ENCOUNTER      Pt Name: Cornelia Garcia  MRN: [id number]  [place] 1970  Date ofevaluation: 10/7/2022  Provider: Zeke Kong MD    CHIEF COMPLAINT       Chief Complaint   Patient presents with    Urinary Tract Infection     Dx with acute pyelonephritis 10/1, pt said that she only took 2 days worth of medicine because it made her sick. Comes back with dysuria. Denies any fevers         HISTORY OF PRESENT ILLNESS   (Location/Symptom, Timing/Onset,Context/Setting, Quality, Duration, Modifying Factors, Severity)  Note limiting factors. Cornelia Garcia is a 47 y.o. female  who  has a past medical history of Ovarian cyst and UTI (lower urinary tract infection). who presents to the emergency department    78-year-old female presents for concern for UTI. Patient was diagnosed with pyelonephritis approximately 7 days ago and took 2 doses of her medications but did not tolerate it. She stated it made her nauseous. She still just had mild chills and subjective myalgias with some dysuria and lower abdominal tenderness. No flank pain. No fevers. No headache. Nothing else seems to make her symptoms better or worse. Similar to previous episodes. The been constant unchanging mild in nature. Pain is nonradiating. Risk factors history of multiple previous infections. No other associated symptoms besides as listed see review of systems below for further details. The history is provided by the patient. No  was used. NursingNotes were reviewed. REVIEW OF SYSTEMS    (2-9 systems for level 4, 10 or more for level 5)     Review of Systems   Constitutional:  Positive for chills. HENT:  Negative for congestion and sore throat. Respiratory: Negative. Negative for shortness of breath. Cardiovascular: Negative. Negative for chest pain. Gastrointestinal:  Positive for abdominal pain.    Genitourinary:  Positive for dysuria. Musculoskeletal:  Positive for myalgias. Skin: Negative. Allergic/Immunologic: Negative for immunocompromised state. Neurological: Negative. Negative for headaches. Except as noted above the remainder of the review of systems was reviewed and negative. PAST MEDICAL HISTORY     Past Medical History:   Diagnosis Date    Ovarian cyst     UTI (lower urinary tract infection)          SURGICALHISTORY       Past Surgical History:   Procedure Laterality Date    BREAST LUMPECTOMY      LYMPH NODE BIOPSY Left          CURRENT MEDICATIONS       Previous Medications    CEFUROXIME (CEFTIN) 250 MG TABLET    Take 1 tablet by mouth 2 times daily for 7 days            Meclizine, Promethazine, and Ultram [tramadol]    FAMILY HISTORY     History reviewed. No pertinent family history. SOCIAL HISTORY       Social History     Socioeconomic History    Marital status:      Spouse name: None    Number of children: None    Years of education: None    Highest education level: None   Tobacco Use    Smoking status: Every Day     Packs/day: 0.50     Types: Cigarettes    Smokeless tobacco: Never   Substance and Sexual Activity    Alcohol use: No    Drug use: No       SCREENINGS    Mehama Coma Scale  Eye Opening: Spontaneous  Best Verbal Response: Oriented  Best Motor Response: Obeys commands  Kaylen Coma Scale Score: 15        PHYSICAL EXAM    (up to 7 for level 4, 8 or more for level 5)     ED Triage Vitals [10/08/22 0120]   BP Temp Temp Source Heart Rate Resp SpO2 Height Weight   136/80 97.6 °F (36.4 °C) Oral 88 14 99 % 5' 2\" (1.575 m) 115 lb 4.8 oz (52.3 kg)       Physical Exam  Vitals and nursing note reviewed. Constitutional:       General: She is not in acute distress. Appearance: Normal appearance. She is not ill-appearing, toxic-appearing or diaphoretic. HENT:      Head: Normocephalic and atraumatic.       Right Ear: External ear normal.      Left Ear: External ear normal.      Nose: Nose normal.      Mouth/Throat:      Mouth: Mucous membranes are moist.   Eyes:      Extraocular Movements: Extraocular movements intact. Cardiovascular:      Rate and Rhythm: Normal rate. Pulses: Normal pulses. Heart sounds: Normal heart sounds. Pulmonary:      Effort: Pulmonary effort is normal.      Breath sounds: Normal breath sounds. Abdominal:      General: There is no distension. Palpations: Abdomen is soft. Tenderness: There is no abdominal tenderness. There is no right CVA tenderness, left CVA tenderness, guarding or rebound. Musculoskeletal:      Cervical back: Normal range of motion and neck supple. Skin:     General: Skin is warm and dry. Capillary Refill: Capillary refill takes less than 2 seconds. Neurological:      General: No focal deficit present. Mental Status: She is alert and oriented to person, place, and time. Psychiatric:         Mood and Affect: Mood normal.         Behavior: Behavior normal.       RESULTS     EKG: All EKG's are interpreted by the Emergency Department Physician who either signs or Co-signs this chart in the absence of a cardiologist.      RADIOLOGY:   Non-plain filmimages such as CT, Ultrasound and MRI are read by the radiologist.  All images reviewed by the emergency department physician who either signs or cosigns this chart.  Interpretation per the Radiologist below, if available at the time ofthis note:    No orders to display         ED BEDSIDE ULTRASOUND:   Performed by ED Physician - none    LABS:  Labs Reviewed   URINALYSIS WITH REFLEX TO CULTURE - Abnormal; Notable for the following components:       Result Value    Blood, Urine TRACE-INTACT (*)     All other components within normal limits   MICROSCOPIC URINALYSIS - Abnormal; Notable for the following components:    Mucus, UA 4+ (*)     WBC, UA 21-50 (*)     Epithelial Cells, UA 11-20 (*)     Bacteria, UA Rare (*)     All other components within normal limits   CULTURE, URINE All other labs were within normal range or not returned as of this dictation. EMERGENCY DEPARTMENT COURSE and DIFFERENTIAL DIAGNOSIS/MDM:   Vitals:    Vitals:    10/08/22 0120   BP: 136/80   Pulse: 88   Resp: 14   Temp: 97.6 °F (36.4 °C)   TempSrc: Oral   SpO2: 99%   Weight: 115 lb 4.8 oz (52.3 kg)   Height: 5' 2\" (1.575 m)       Patient was given thefollowing medications:  Medications - No data to display    ED COURSE & MEDICAL DECISION MAKING    Pertinent Labs & Imaging studies reviewed. (See chart for details)   -  Patient seen and evaluated in the emergency department. -  Triage and nursing notes reviewed and incorporated. -  Old chart records reviewed and incorporated. -  Differential diagnosis includes: Differential Diagnosis: Urinary retention, pyelonephritis, bladder infection, urosepsis, GI emergency, surgical emergency, dehydration, musculoskeletal back pain, vaginal candidiasis, other    59-year-old female with generalized myalgias, mild lower abdominal tenderness, dysuria consistent with cystitis and/or very mild pyelonephritis patient is afebrile not tachycardic saturating well on room air normotensive no signs of systemic infection. No reproducible abdominal tenderness no peritoneal findings. Patient took 2 doses of antibiotics last week urine does not appear as infected as previous but does still show signs of white blood cells. Given patient's previous culture we will give her on a different antibiotic she did not tolerate Ceftin will give her Bactrim she states she has had that before with no complaints. We will give strict return precautions for any new or worsening symptoms as well as instructions to follow-up with primary care. -  Work-up included:  See above  -  ED treatment included: See above  -  Results discussed with patient. The patient is agreeable with plan of care and disposition.     Is this patient to be included in the SEP-1 Core Measure due to severe sepsis or septic shock? No   Exclusion criteria - the patient is NOT to be included for SEP-1 Core Measure due to: Infection is not suspected     REASSESSMENT      The patient is at low risk for mortality based on demographic, history and clinical factors. Given the best available information and clinical assessment, I estimate the risk of hospitalization to be greater than risk of treatment at home. I have explained to the patient that the risk could rapidly change, given precautions for return and instructions. Explained to patient that the risk for mortality is low based on demographic, history and clinical factors. I discussed with patient the results of evaluation in the ED, diagnosis, care, and prognosis. The plan is to discharge to home. Patient is in agreement with plan and questions have been answered. I also discussed with patient the reasons which may require a return visit and the importance of follow-up care. The patient is well-appearing, nontoxic, and improved at the time of discharge. Patient agrees to call to arrange follow-up care as directed. Patient understands to return immediately for worsening/change in symptoms. CRITICAL CARE TIME   Total Critical Care time was 0 minutes, excluding separately reportable procedures. There was a high probability of clinically significant/life threatening deterioration in the patient's condition which required my urgent intervention. This includes multiple reevaluations, vital sign monitoring, pulse oximetry monitoring, telemetry monitoring, clinical response to the IV medications, reviewing the nursing notes, consultation time, dictation/documentation time, and interpretation of the labwork. (This time excludes time spent performing procedures). CONSULTS:  None    PROCEDURES:  Unless otherwise noted below, none     Procedures    FINAL IMPRESSION      1.  Acute cystitis without hematuria          DISPOSITION/PLAN   DISPOSITION Decision To Discharge 10/08/2022 02:11:16 AM      PATIENT REFERREDTO:  [facility]  [phone]  Schedule an appointment as soon as possible for a visit       [place] [address]  [phone]    As needed, If symptoms worsen      DISCHARGEMEDICATIONS:  New Prescriptions    SULFAMETHOXAZOLE-TRIMETHOPRIM (BACTRIM DS;SEPTRA DS) 800-160 MG PER TABLET    Take 1 tablet by mouth 2 times daily for 7 days          (Please note that portions of this note were completed with a voice recognition program.  Efforts were made to edit the dictations but occasionally words are mis-transcribed.)    Elsie Caceres MD (electronically signed)  Attending Emergency Physician          Elsie Caceres MD  10/08/22 0516

## 2022-10-09 LAB — URINE CULTURE, ROUTINE: NORMAL

## 2022-10-11 ENCOUNTER — HOSPITAL ENCOUNTER (EMERGENCY)
Age: 52
Discharge: HOME | End: 2022-10-11
Attending: EMERGENCY MEDICINE
Payer: MEDICAID

## 2022-10-11 VITALS
OXYGEN SATURATION: 100 % | DIASTOLIC BLOOD PRESSURE: 92 MMHG | HEIGHT: 63 IN | RESPIRATION RATE: 16 BRPM | TEMPERATURE: 98.2 F | WEIGHT: 117.06 LBS | BODY MASS INDEX: 20.74 KG/M2 | HEART RATE: 75 BPM | SYSTOLIC BLOOD PRESSURE: 128 MMHG

## 2022-10-11 DIAGNOSIS — N30.00 ACUTE CYSTITIS WITHOUT HEMATURIA: Primary | ICD-10-CM

## 2022-10-11 DIAGNOSIS — R53.1 GENERALIZED WEAKNESS: ICD-10-CM

## 2022-10-11 DIAGNOSIS — Z71.1 FEARED CONDITION NOT DEMONSTRATED: ICD-10-CM

## 2022-10-11 LAB
ANION GAP SERPL CALCULATED.3IONS-SCNC: 12 MMOL/L (ref 3–16)
BACTERIA: ABNORMAL /HPF
BASOPHILS ABSOLUTE: 0 K/UL (ref 0–0.2)
BASOPHILS RELATIVE PERCENT: 1.1 %
BILIRUBIN URINE: NEGATIVE
BLOOD, URINE: NEGATIVE
BUN BLDV-MCNC: 12 MG/DL (ref 7–20)
CALCIUM SERPL-MCNC: 9.5 MG/DL (ref 8.3–10.6)
CHLORIDE BLD-SCNC: 103 MMOL/L (ref 99–110)
CLARITY: ABNORMAL
CO2: 26 MMOL/L (ref 21–32)
COLOR: YELLOW
CREAT SERPL-MCNC: 0.6 MG/DL (ref 0.6–1.1)
EOSINOPHILS ABSOLUTE: 0.1 K/UL (ref 0–0.6)
EOSINOPHILS RELATIVE PERCENT: 1.9 %
EPITHELIAL CELLS, UA: ABNORMAL /HPF (ref 0–5)
FERRITIN: 43.1 NG/ML (ref 15–150)
GFR AFRICAN AMERICAN: >60
GFR NON-AFRICAN AMERICAN: >60
GLUCOSE BLD-MCNC: 93 MG/DL (ref 70–99)
GLUCOSE URINE: NEGATIVE MG/DL
HCT VFR BLD CALC: 43.6 % (ref 36–48)
HEMOGLOBIN: 15.2 G/DL (ref 12–16)
IRON: 126 UG/DL (ref 37–145)
KETONES, URINE: NEGATIVE MG/DL
LEUKOCYTE ESTERASE, URINE: ABNORMAL
LYMPHOCYTES ABSOLUTE: 1.2 K/UL (ref 1–5.1)
LYMPHOCYTES RELATIVE PERCENT: 31.6 %
MCH RBC QN AUTO: 31.1 PG (ref 26–34)
MCHC RBC AUTO-ENTMCNC: 34.9 G/DL (ref 31–36)
MCV RBC AUTO: 89 FL (ref 80–100)
MICROSCOPIC EXAMINATION: YES
MONOCYTES ABSOLUTE: 0.3 K/UL (ref 0–1.3)
MONOCYTES RELATIVE PERCENT: 6.9 %
NEUTROPHILS ABSOLUTE: 2.2 K/UL (ref 1.7–7.7)
NEUTROPHILS RELATIVE PERCENT: 58.5 %
NITRITE, URINE: NEGATIVE
PDW BLD-RTO: 14.2 % (ref 12.4–15.4)
PH UA: 7 (ref 5–8)
PLATELET # BLD: 187 K/UL (ref 135–450)
PMV BLD AUTO: 7.9 FL (ref 5–10.5)
POTASSIUM REFLEX MAGNESIUM: 3.7 MMOL/L (ref 3.5–5.1)
PROTEIN UA: NEGATIVE MG/DL
RBC # BLD: 4.91 M/UL (ref 4–5.2)
RBC UA: ABNORMAL /HPF (ref 0–4)
SODIUM BLD-SCNC: 141 MMOL/L (ref 136–145)
SPECIFIC GRAVITY UA: 1.01 (ref 1–1.03)
URINE REFLEX TO CULTURE: ABNORMAL
URINE TYPE: ABNORMAL
UROBILINOGEN, URINE: 0.2 E.U./DL
WBC # BLD: 3.8 K/UL (ref 4–11)
WBC UA: ABNORMAL /HPF (ref 0–5)

## 2022-10-11 PROCEDURE — 80048 BASIC METABOLIC PNL TOTAL CA: CPT

## 2022-10-11 PROCEDURE — 82728 ASSAY OF FERRITIN: CPT

## 2022-10-11 PROCEDURE — 83540 ASSAY OF IRON: CPT

## 2022-10-11 PROCEDURE — 99284 EMERGENCY DEPT VISIT MOD MDM: CPT

## 2022-10-11 PROCEDURE — 2580000003 HC RX 258: Performed by: EMERGENCY MEDICINE

## 2022-10-11 PROCEDURE — 85025 COMPLETE CBC W/AUTO DIFF WBC: CPT

## 2022-10-11 PROCEDURE — 81001 URINALYSIS AUTO W/SCOPE: CPT

## 2022-10-11 PROCEDURE — 36415 COLL VENOUS BLD VENIPUNCTURE: CPT

## 2022-10-11 RX ORDER — 0.9 % SODIUM CHLORIDE 0.9 %
1000 INTRAVENOUS SOLUTION INTRAVENOUS ONCE
Status: COMPLETED | OUTPATIENT
Start: 2022-10-11 | End: 2022-10-11

## 2022-10-11 RX ADMIN — SODIUM CHLORIDE 1000 ML: 9 INJECTION, SOLUTION INTRAVENOUS at 09:43

## 2022-10-11 ASSESSMENT — PAIN - FUNCTIONAL ASSESSMENT
PAIN_FUNCTIONAL_ASSESSMENT: NONE - DENIES PAIN
PAIN_FUNCTIONAL_ASSESSMENT: NONE - DENIES PAIN

## 2022-10-11 NOTE — ED PROVIDER NOTES
[address] [facility] ENCOUNTER      Pt Name: Omer Siddiqi  MRN: [id number]  [place] 1970  Date of evaluation: 10/11/2022  Provider: Pleasant Bumpers, [facility]  Chief Complaint   Patient presents with    Fatigue     C/o fatigue x 1 week. This patient is at risk for COVID-19 viral infection. Therefore, personal protection equipment consisting of a mask and gloves was worn for the exam.    HPI  Omer Siddiqi is a 47 y.o. female who presents with feeling fatigued for 1 week per nurses notes. She told me 2 days. She denies any fevers or chills. She does have a history of anemia and had similar symptoms. She states that she needs iron. However, she has not gone to the store and bought any iron. She states she cannot afford it. She is on Bactrim for a kidney infection and has 2 days left. She denies any other complaints at this time. She received a prescription here at this facility. She states she has not had a diagnosis for her anemia. REVIEW OF SYSTEMS  All systems negative except as noted in the HPI. Reviewed Nurses' notes and concur. Patient's last menstrual period was 08/10/2018 (approximate). PAST MEDICAL HISTORY  Past Medical History:   Diagnosis Date    Ovarian cyst     UTI (lower urinary tract infection)        FAMILY HISTORY  History reviewed. No pertinent family history. SOCIAL HISTORY   reports that she has been smoking cigarettes. She has been smoking an average of .5 packs per day. She has never used smokeless tobacco. She reports that she does not drink alcohol and does not use drugs.     SURGICAL HISTORY  Past Surgical History:   Procedure Laterality Date    BREAST LUMPECTOMY      LYMPH NODE BIOPSY Left        CURRENT MEDICATIONS  Current Outpatient Rx   Medication Sig Dispense Refill    sulfamethoxazole-trimethoprim (BACTRIM DS;SEPTRA DS) 800-160 MG per tablet Take 1 tablet by mouth 2 times daily for 7 days 14 tablet 0       ALLERGIES  Allergies   Allergen Reactions    Meclizine     Promethazine Hives    Ultram [Tramadol] Rash       Tetanus vaccination status reviewed: tetanus immunization not indicated    PHYSICAL EXAM  VITAL SIGNS: BP (!) 128/92   Pulse 75   Temp 98.2 °F (36.8 °C) (Oral)   Resp 16   Ht 5' 3\" (1.6 m)   Wt 117 lb 1 oz (53.1 kg)   LMP 08/10/2018 (Approximate)   SpO2 100%   BMI 20.74 kg/m²   Constitutional: Well-developed, well-nourished, appears normal, nontoxic, activity: Resting comfortably on the cart, no obvious pain, speaking full sentences, does not appear ill or toxic. HENT: Normocephalic, Atraumatic, Bilateral external ears normal, TM's were normal, Mucus membranes are pale but moist and oropharynx is patent and clear, No oral exudates, Nose normal.  Eyes:  Conjunctiva pale, No discharge. No scleral icterus. Neck: Normal range of motion, No tenderness, Supple. Lymphatic: No lymphadenopathy noted. Cardiovascular: Normal heart rate, Normal rhythm, no murmurs, no gallops, no rubs. Thorax & Lungs: Normal breath sounds, no respiratory distress, no wheezing, no rales, no rhonchi  Abdomen: Soft, Nontender, No hepatosplenomegaly, No masses, No pulsatile masses, No distension, normal bowel sounds  Skin: Warm, Dry, No erythema, No rash. Extremities: no major deformities noted.   Neurologic: Alert & oriented x 3  Psychiatric: Affect normal, Mood normal.    ?  LABORATORY  Labs Reviewed   CBC WITH AUTO DIFFERENTIAL - Abnormal; Notable for the following components:       Result Value    WBC 3.8 (*)     All other components within normal limits   URINALYSIS WITH REFLEX TO CULTURE - Abnormal; Notable for the following components:    Clarity, UA CLOUDY (*)     Leukocyte Esterase, Urine TRACE (*)     All other components within normal limits   MICROSCOPIC URINALYSIS - Abnormal; Notable for the following components:    Epithelial Cells, UA 11-20 (*)     Bacteria, UA 4+ (*)     All other components within normal limits   BASIC METABOLIC PANEL W/ REFLEX TO MG FOR LOW K   FERRITIN   IRON         COURSE & MEDICAL DECISION MAKING  Pertinent Labs reviewed. (See chart for details)    Vitals:    10/11/22 0850   BP: (!) 128/92   Pulse: 75   Resp: 16   Temp: 98.2 °F (36.8 °C)   TempSrc: Oral   SpO2: 100%   Weight: 117 lb 1 oz (53.1 kg)   Height: 5' 3\" (1.6 m)       Medications   0.9 % sodium chloride bolus (0 mLs IntraVENous Stopped 10/11/22 1045)       New Prescriptions    No medications on file       SEP-1 CORE MEASURE DATA  Exclusion criteria: the patient is NOT to be included for sepsis due to: Infection is not suspected    Patient remained stable in the ED. her urine showed 4+ bacteria but no white cells. Her hemoglobin was 15. Therefore, I do not feel any further treatment is necessary at this time. Patient was discharged to follow with her doctor and was given referral to a PCP. She was given a work excuse for today. She was instructed to return to the emergency department if she had any problems. The patient's blood pressure was found to be elevated according to CMS/Medicare and the Affordable Care Act/ObamaCare criteria. Elevated blood pressure could occur because of pain or anxiety or other reasons and does not mean that they need to have their blood pressure treated or medications otherwise adjusted. However, this could also be a sign that they will need to have their blood pressure treated or medications changed. The patient was instructed to follow up closely with their personal physician to have their blood pressure rechecked. The patient was instructed to take a list of recent blood pressure readings to their next visit with their personal physician. See discharge instructions for specific medications, discharge information, and treatments. They were verbally instructed to return to emergency if any problems. (This chart has been completed using [address]. Although attempts have been made to ensure accuracy, words and/or phrases may not be transcribed as intended.)    Patient refused pain medicines at the time of their exam.    IMPRESSION(S):  1. Acute cystitis without hematuria    2. Generalized weakness    3. Feared condition not demonstrated        ? Recheck Times: 1110    Diagnostic considerations include but are not limited to:  Hemorrhagic Shock, Coagulopathy, Platelet Dysfunction or Thrombocytopenia, Ischemic Bowel, Arterial Venous Malformation of the colon, unstable bleeding from Diverticulosis, Colon Cancer/other GI cancer, upper GI bleed, hemorrhoids, rectal fissure, angiodysplasia of the intestine, aorto-enteric fistula other.          Roberto Hinkle DO  10/11/22 1114

## 2022-10-11 NOTE — Clinical Note
Kaia Avalos was seen and treated in our emergency department on 10/11/2022. She may return to work on 10/12/2022. No restrictions     If you have any questions or concerns, please don't hesitate to call.       Crispin Vargas, DO

## 2022-10-11 NOTE — DISCHARGE INSTRUCTIONS
You may take Tylenol (acetaminophen) and/or Motrin (ibuprofen) with the medications that are prescribed for you, if you are permitted to take these medications. Please follow package directions for the appropriate dosing and frequency. Please continue your present treatments and medications until you receive further instructions from your healthcare professional. Take all your medications as prescribed unless a healthcare professional instructs you to do otherwise.